# Patient Record
(demographics unavailable — no encounter records)

---

## 2025-02-21 NOTE — END OF VISIT
[FreeTextEntry3] : Documented by Katya Weaver acting as a scribe for Mk Arnold on 02/20/2025   All medical record entries made by the Scribe were at my, Dr. Mk Arnold direction and personally dictated by me on 02/20/2025 . I have reviewed the chart and agree that the record accurately reflects my personal performance of the history, physical exam, assessment and plan. I have also personally directed, reviewed, and agreed with the chart.

## 2025-02-21 NOTE — PHYSICAL EXAM
[Normal] : Alert and in no acute distress [de-identified] : Cervical Spine:   Tandem walk: intact    ROM: full ROM, pain with Lt rotation and Lt lateral bend   Palpation: +ttp Rt trap   Motor Strength:     Right: 5/5 throughout      Left: 5/5 throughout    Sensation:      Right: SILT throughout      Left: SILT throughout [de-identified] : His x-rays show massive anterior osteophytes at C5-6 and to a lesser degree at C6-7.

## 2025-02-21 NOTE — PLAN
[TextEntry] : His esophagus has not yet affected his swallowing. We will get a CT scan and MRI to evaluate the soft tissues, spinal cord, and discs. At some point, we will have to remove the anterior osteophytes. In the interim, we'll plan on MRI the cervical spine to see if there's no compression consistent with his symptoms. He is neurologically intact. He will follow up after completion of the aforementioned MRI prior to consideration of surgical intervention. Clearly, the resection of these osteophytes is not an urgent or emergent situation.

## 2025-02-21 NOTE — ADDENDUM
[FreeTextEntry1] : I, Katya Weaver (scribe) assisted in filling out this chart under the dictation of Mk Arnold on 02/20/2025

## 2025-02-21 NOTE — HISTORY OF PRESENT ILLNESS
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 He does not take any medication. He is continuing to work. He will ultimately need the anterior osteophytes resected due to their massive nature and their impingement on his anterior structures, including his esophagus. This will ultimately need to be removed.

## 2025-02-21 NOTE — PHYSICAL EXAM
[Normal] : Alert and in no acute distress [de-identified] : Cervical Spine:   Tandem walk: intact    ROM: full ROM, pain with Lt rotation and Lt lateral bend   Palpation: +ttp Rt trap   Motor Strength:     Right: 5/5 throughout      Left: 5/5 throughout    Sensation:      Right: SILT throughout      Left: SILT throughout [de-identified] : His x-rays show massive anterior osteophytes at C5-6 and to a lesser degree at C6-7.

## 2025-02-21 NOTE — HISTORY OF PRESENT ILLNESS
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 He does not take any medication. He is continuing to work. He will ultimately need the anterior osteophytes resected due to their massive nature and their impingement on his anterior structures, including his esophagus. This will ultimately need to be removed.

## 2025-04-03 NOTE — END OF VISIT
[FreeTextEntry3] : Documented by Katya Weaver acting as a scribe for Mk Arnold on 04/03/2025   All medical record entries made by the Scribe were at my, Dr. Mk Arnold direction and personally dictated by me on 04/03/2025 . I have reviewed the chart and agree that the record accurately reflects my personal performance of the history, physical exam, assessment and plan. I have also personally directed, reviewed, and agreed with the chart.

## 2025-04-03 NOTE — ADDENDUM
[FreeTextEntry1] : I, Katya Weaver (scribe) assisted in filling out this chart under the dictation of Mk Arnold on 04/03/2025

## 2025-04-03 NOTE — PHYSICAL EXAM
[de-identified] : He has no long-tract signs, hyperreflexia, or weakness. He is still able to tandem walk.  He has not lost his dexterity.

## 2025-04-03 NOTE — HISTORY OF PRESENT ILLNESS
[de-identified] : Nathan Canada is here for a review of his CAT scan as well as MRI. He has cord compression at C3-4 and C4-5 without myelomalacia. He continues to work as a dentist. He is not choking. He is not having any breathing difficulties or other complications.

## 2025-04-03 NOTE — PHYSICAL EXAM
[de-identified] : He has no long-tract signs, hyperreflexia, or weakness. He is still able to tandem walk.  He has not lost his dexterity.

## 2025-04-03 NOTE — HISTORY OF PRESENT ILLNESS
[de-identified] : Nathan Canada is here for a review of his CAT scan as well as MRI. He has cord compression at C3-4 and C4-5 without myelomalacia. He continues to work as a dentist. He is not choking. He is not having any breathing difficulties or other complications.

## 2025-04-03 NOTE — PLAN
[TextEntry] : He wishes to continue with conservative treatment, including physiotherapy, and follow-up in 4-6 months' time. He was warned of the signs of impending worsening myelopathy, including hand numbness, dexterity issues, inability to tandem walk balance issues, pain, numbness, tingling, pins, needles, weakness, and others. He verbalized understanding and the wish to proceed with the aforementioned conservative treatment plan. At some point, he will require an anterior cervical discectomy and fusion, at C3-4 and C4-5. At the time, we will also remove his massive exostosis at C5-6 and C6-7. It has only affected his post-nasal drip at this point, and he feels like he is building up phlegm, but he is still able to swallow.